# Patient Record
Sex: MALE | Race: ASIAN | Employment: FULL TIME | URBAN - METROPOLITAN AREA
[De-identification: names, ages, dates, MRNs, and addresses within clinical notes are randomized per-mention and may not be internally consistent; named-entity substitution may affect disease eponyms.]

---

## 2017-06-29 ENCOUNTER — LAB ENCOUNTER (OUTPATIENT)
Dept: LAB | Age: 47
End: 2017-06-29
Attending: INTERNAL MEDICINE
Payer: COMMERCIAL

## 2017-06-29 DIAGNOSIS — R53.1 WEAKNESS: Primary | ICD-10-CM

## 2017-06-29 LAB
ALBUMIN SERPL BCP-MCNC: 3.6 G/DL (ref 3.5–4.8)
ALBUMIN/GLOB SERPL: 0.9 {RATIO} (ref 1–2)
ALP SERPL-CCNC: 101 U/L (ref 32–100)
ALT SERPL-CCNC: 62 U/L (ref 17–63)
ANION GAP SERPL CALC-SCNC: 12 MMOL/L (ref 0–18)
AST SERPL-CCNC: 174 U/L (ref 15–41)
BACTERIA UR QL AUTO: NEGATIVE /HPF
BASOPHILS # BLD: 0.2 K/UL (ref 0–0.2)
BASOPHILS NFR BLD: 4 %
BILIRUB SERPL-MCNC: 1.2 MG/DL (ref 0.3–1.2)
BILIRUB UR QL: NEGATIVE
BUN SERPL-MCNC: 4 MG/DL (ref 8–20)
BUN/CREAT SERPL: 7.4 (ref 10–20)
CALCIUM SERPL-MCNC: 9 MG/DL (ref 8.5–10.5)
CHLORIDE SERPL-SCNC: 99 MMOL/L (ref 95–110)
CHOLEST SERPL-MCNC: 242 MG/DL (ref 110–200)
CLARITY UR: CLEAR
CO2 SERPL-SCNC: 28 MMOL/L (ref 22–32)
COLOR UR: YELLOW
CREAT SERPL-MCNC: 0.54 MG/DL (ref 0.5–1.5)
EOSINOPHIL # BLD: 0.7 K/UL (ref 0–0.7)
EOSINOPHIL NFR BLD: 15 %
ERYTHROCYTE [DISTWIDTH] IN BLOOD BY AUTOMATED COUNT: 13.8 % (ref 11–15)
GLOBULIN PLAS-MCNC: 3.8 G/DL (ref 2.5–3.7)
GLUCOSE SERPL-MCNC: 100 MG/DL (ref 70–99)
GLUCOSE UR-MCNC: NEGATIVE MG/DL
HCT VFR BLD AUTO: 39.8 % (ref 41–52)
HDLC SERPL-MCNC: 45 MG/DL
HGB BLD-MCNC: 13.8 G/DL (ref 13.5–17.5)
KETONES UR-MCNC: NEGATIVE MG/DL
LDLC SERPL CALC-MCNC: 160 MG/DL (ref 0–99)
LEUKOCYTE ESTERASE UR QL STRIP.AUTO: NEGATIVE
LYMPHOCYTES # BLD: 1.6 K/UL (ref 1–4)
LYMPHOCYTES NFR BLD: 35 %
MCH RBC QN AUTO: 35 PG (ref 27–32)
MCHC RBC AUTO-ENTMCNC: 34.5 G/DL (ref 32–37)
MCV RBC AUTO: 101.3 FL (ref 80–100)
MONOCYTES # BLD: 0.4 K/UL (ref 0–1)
MONOCYTES NFR BLD: 9 %
NEUTROPHILS # BLD AUTO: 1.7 K/UL (ref 1.8–7.7)
NEUTROPHILS NFR BLD: 37 %
NITRITE UR QL STRIP.AUTO: NEGATIVE
NONHDLC SERPL-MCNC: 197 MG/DL
OSMOLALITY UR CALC.SUM OF ELEC: 285 MOSM/KG (ref 275–295)
PH UR: 6 [PH] (ref 5–8)
PLATELET # BLD AUTO: 69 K/UL (ref 140–400)
PMV BLD AUTO: 10.7 FL (ref 7.4–10.3)
POTASSIUM SERPL-SCNC: 3.2 MMOL/L (ref 3.3–5.1)
PROT SERPL-MCNC: 7.4 G/DL (ref 5.9–8.4)
PROT UR-MCNC: 30 MG/DL
RBC # BLD AUTO: 3.93 M/UL (ref 4.5–5.9)
RBC #/AREA URNS AUTO: 1 /HPF
SODIUM SERPL-SCNC: 139 MMOL/L (ref 136–144)
SP GR UR STRIP: 1.01 (ref 1–1.03)
TRIGL SERPL-MCNC: 185 MG/DL (ref 1–149)
UROBILINOGEN UR STRIP-ACNC: <2
VIT C UR-MCNC: NEGATIVE MG/DL
WBC # BLD AUTO: 4.5 K/UL (ref 4–11)
WBC #/AREA URNS AUTO: 3 /HPF

## 2017-06-29 PROCEDURE — 81001 URINALYSIS AUTO W/SCOPE: CPT

## 2017-06-29 PROCEDURE — 80061 LIPID PANEL: CPT

## 2017-06-29 PROCEDURE — 80053 COMPREHEN METABOLIC PANEL: CPT

## 2017-06-29 PROCEDURE — 36415 COLL VENOUS BLD VENIPUNCTURE: CPT

## 2017-06-29 PROCEDURE — 85025 COMPLETE CBC W/AUTO DIFF WBC: CPT

## 2017-07-21 PROBLEM — F10.939 ALCOHOL WITHDRAWAL (HCC): Status: ACTIVE | Noted: 2017-07-21

## 2017-07-21 PROBLEM — F10.239 ALCOHOL WITHDRAWAL (HCC): Status: ACTIVE | Noted: 2017-07-21

## 2017-07-21 PROBLEM — F10.10 ALCOHOL ABUSE: Status: ACTIVE | Noted: 2017-07-21

## 2017-07-21 NOTE — ED PROVIDER NOTES
Patient Seen in: [facility] AND [facility] Emergency Department    History   Patient presents with:  Eval-P (psychiatric)    Stated Complaint: ETOH treatment    HPI    41year old male who presents for alcohol detox.   The patient states he has been drinking appr distress. HENT:   Head: Normocephalic and atraumatic. Eyes: Conjunctivae and EOM are normal. Pupils are equal, round, and reactive to light. Neck: Normal range of motion. Neck supple.    Cardiovascular: Normal rate, regular rhythm, normal heart sounds Final result                 Please view results for these tests on the individual orders.    RAINBOW DRAW BLUE   RAINBOW DRAW GOLD   RAINBOW DRAW LAVENDER   RAINBOW DRAW LIGHT GREEN   RAINBOW DRAW DARK GREEN   RAINBOW DRAW LAVENDER TALL (BNP)       ==

## 2017-07-21 NOTE — ED NOTES
Care assumed from triage Ambulates with steady gait. Alert and interactive Presents with request for ETOH detox. Last drink 7/20 1/5th of Rum last detox 1 year ago. Denies sz.  Recent outpt labs show elevated LFTs Denies SI/HI/AH/VH

## 2017-07-22 NOTE — PROGRESS NOTES
[facility]    Progress Note    Aaron Garcia Patient Status:  Inpatient    1/10/1970 MRN [id number]   Location [facility] 3W/SW Attending Carly Ayala MD   Hosp Day # 1 PCP Gary Yanes MD, MD     Subjective:     Cons 07/22/2017   AST 96 (H) 07/22/2017   ALT 39 07/22/2017   PTT 28.8 07/22/2017   INR 1.1 07/22/2017   TSH 3.98 07/22/2017   MG 1.2 (L) 07/22/2017   ETOH 121 (H) 07/21/2017                         Sarah Trinidad MD, MD  7/22/2017

## 2017-07-22 NOTE — PLAN OF CARE
CONTINUING CIWA Q2H, SCORES BETWEEN 4 AND 10 TODAY, PT. WITH NAUSEA, GIVEN REGLAN, AND MULTIPLE EPISODES OF DIARRHEA, SAMPLE COLLECTED, PT. ON ISOLATION FOR NOW, IMMODIUM GIVEN BUT NOT HELPING, PT. CONTINUES TO HAVE HIGH BLOOD PRESSURES

## 2017-07-23 NOTE — PLAN OF CARE
CONTINUING CIWA Q4H, SCORES 4 TODAY, PT. FEELING BETTER, STILL HAS TREMORS, DIARRHEA STOPPED AFTER IMODIUM GIVEN, MAGNESIUM AND POTASSIUM REPLACED PER PROTOCOL

## 2017-07-23 NOTE — PLAN OF CARE
DRUG ABUSE/DETOX    • Will have no detox symptoms and will verbalize plan for changing drug-related behavior Not Progressing          CARDIOVASCULAR - ADULT    • Maintains optimal cardiac output and hemodynamic stability Progressing    • Absence of cardiac

## 2017-07-23 NOTE — H&P
[facility]    PATIENT'S NAME: Lucas Felix   ATTENDING PHYSICIAN: Lyubov Freire MD   PATIENT ACCOUNT#:   [id number]    LOCATION:  [address] [place] [address] RECORD #:   [id number]       YOB: 1970  ADMISSION DATE:       07/21 oriented x3, and he gave all pertinent information. Wife is at bedside. HEENT:  Conjunctivae are anicteric. The rest is unremarkable. NECK:  Supple without any lymph node enlargement or carotid bruits.   LUNGS:  Clear bilaterally without any wheezin

## 2017-07-24 NOTE — SIGNIFICANT EVENT
Rapid response note    Rapid response called by nurse for severe confusion. Patient was admitted 3 days ago for alcohol intoxication. There is no significant withdrawal at that time. His detox scores have been low overall.   This morning the wife noted t

## 2017-07-24 NOTE — PROGRESS NOTES
RN called into room by patients wife and patients wife states that patient does not remember who she was. Patient with tremors and asking to go smoke a cigarette. RRT called for further assistance.  Dr [place] hospitalist present orders for ativan IV carried a

## 2017-07-24 NOTE — PLAN OF CARE
CARDIOVASCULAR - ADULT    • Maintains optimal cardiac output and hemodynamic stability Progressing    • Absence of cardiac arrhythmias or at baseline Progressing          DRUG ABUSE/DETOX    • Will have no detox symptoms and will verbalize plan for [place]

## 2017-07-24 NOTE — PROGRESS NOTES
[facility]    [facility] Note    Julien Chicago Patient Status:  Inpatient    1/10/1970 MRN [id number]   Location [facility] 3W/SW Attending Anahi Conn MD   Hosp Day # 2 PCP Rogelio Parr MD, MD     Subjective:     Cons 07/22/2017   PTT 28.8 07/22/2017   INR 1.1 07/22/2017   TSH 3.98 07/22/2017   MG 1.8 07/23/2017   ETOH 121 (H) 07/21/2017                         Val Fernandez MD, MD  7/22/2017

## 2017-07-25 NOTE — PROGRESS NOTES
[facility]    Progress Note    Tasha Ruggiero Patient Status:  Inpatient    1/10/1970 MRN [id number]   Location [facility] 3W/SW Attending Gamal Cabrera MD   Hosp Day # 3 PCP Montel Hashimoto, MD, MD     Subjective:     Cons 07/22/2017   ALB 3.5 07/22/2017   ALKPHO 90 07/22/2017   BILT 2.2 (H) 07/22/2017   TP 7.4 07/22/2017   AST 96 (H) 07/22/2017   ALT 39 07/22/2017   PTT 28.8 07/22/2017   INR 1.1 07/22/2017   TSH 3.98 07/22/2017   MG 1.8 07/24/2017   ETOH 121 (H) 07/21/2017

## 2017-07-25 NOTE — PLAN OF CARE
Problem: Patient/Family Goals  Goal: Patient/Family Long Term Goal  Patient's Long Term Goal: To be discharged home.     Interventions:  - See additional Care Plan goals for specific interventions   Outcome: Progressing    Goal: Patient/Family Short Term Go signs of decreased coronary artery perfusion - ex. Angina  - Evaluate fluid balance, assess for edema, trend weights   Outcome: Progressing  Patient ST on telemetry, but is hemodynamically stable at this time.  Patient denies CP, dizziness, or lightheadedne appropriate pain scale  - Administer analgesics based on type and severity of pain and evaluate response  - Implement non-pharmacological measures as appropriate and evaluate response  - Consider cultural and social influences on pain and pain management

## 2017-07-26 NOTE — PROGRESS NOTES
[facility]    Progress Note    Barbra Marie Patient Status:  Inpatient    1/10/1970 MRN [id number]   Location [facility] 3W/SW Attending Swapna Jung MD   Hosp Day # 4 PCP Era Anne MD, MD     Subjective:     Cons 07/25/2017   GLU 99 07/25/2017   CA 8.8 07/25/2017   ALB 3.5 07/22/2017   ALKPHO 90 07/22/2017   BILT 2.2 (H) 07/22/2017   TP 7.4 07/22/2017   AST 96 (H) 07/22/2017   ALT 39 07/22/2017   PTT 28.8 07/22/2017   INR 1.1 07/22/2017   TSH 3.98 07/22/2017   MG 1

## 2017-07-26 NOTE — PROGRESS NOTES
[facility]    Progress Note    Marilee Lambert Patient Status:  Inpatient    1/10/1970 MRN [id number]   Location [facility] 3W/SW Attending Indiana Hand MD   Hosp Day # 5 PCP Jono Vidales MD, MD     Subjective:     Cons 07/26/2017    (L) 07/26/2017   K 4.0 07/26/2017   K 4.0 07/26/2017   CL 99 07/26/2017   CO2 24 07/26/2017   GLU 99 07/26/2017   CA 9.1 07/26/2017   ALB 3.5 07/22/2017   ALKPHO 90 07/22/2017   BILT 2.2 (H) 07/22/2017   TP 7.4 07/22/2017   AST 96 (H) 0

## 2017-07-26 NOTE — PROGRESS NOTES
Spoke with Dr. Latisha Aguiar about patient's plan of care. ICU floor is reaching capacity. Request patient to be transferred. Physician aware. Okay to transfer patient to general floor. Patient to transfer to Cardiac Telemetry on remote tele. Continue all orders.

## 2017-07-26 NOTE — PROGRESS NOTES
[facility]    Progress Note    Bella Horton Patient Status:  Inpatient    1/10/1970 MRN [id number]   Location [facility] 3W/SW Attending Vi Aragon MD   Hosp Day # 5 PCP Nikolay Mcbride MD, MD     Subjective:     Cons CO2 24 07/26/2017   GLU 99 07/26/2017   CA 9.1 07/26/2017   ALB 3.5 07/22/2017   ALKPHO 90 07/22/2017   BILT 2.2 (H) 07/22/2017   TP 7.4 07/22/2017   AST 96 (H) 07/22/2017   ALT 39 07/22/2017   PTT 28.8 07/22/2017   INR 1.1 07/22/2017   TSH 3.98 07/22/20

## 2017-07-26 NOTE — PROGRESS NOTES
Patient report given to [facility] on Cardiac Telemetry. Questions and concerns answered at this time. Ready to transfer patient.

## 2017-07-26 NOTE — PLAN OF CARE
DRUG ABUSE/DETOX    • Will have no detox symptoms and will verbalize plan for changing drug-related behavior Not Progressing    Hand tremors noted. Occasionally disoriented to date, time and location. Calm. Ativan Drip. See mar.        CARDIOVASCULAR - ADUL

## 2017-07-26 NOTE — PLAN OF CARE
CARDIOVASCULAR - ADULT    • Maintains optimal cardiac output and hemodynamic stability Progressing    • Absence of cardiac arrhythmias or at baseline Progressing    Output/hemodynamic stability achieved     DRUG ABUSE/DETOX    • Will have no detox symptoms

## 2017-07-26 NOTE — PROGRESS NOTES
7/26/17 - per RN stool studies negative. Patient no longer having diarrhea. Will remove Altapure indication.

## 2017-07-26 NOTE — PROGRESS NOTES
[facility]    Progress Note    Susanne Kanner Patient Status:  Inpatient    1/10/1970 MRN [id number]   Location [facility] 3W/SW Attending Araceli Smiley MD   Hosp Day # 5 PCP Sarah Trinidad MD, MD     Subjective:   Eduard Cox

## 2017-07-27 NOTE — OCCUPATIONAL THERAPY NOTE
OCCUPATIONAL THERAPY QUICK EVALUATION - INPATIENT    Room Number: 343/343-A  Evaluation Date: 7/27/2017     Type of Evaluation: Initial  Presenting Problem: etoh    Physician Order: IP Consult to Occupational Therapy  Reason for Therapy:  ADL/IADL Dysfunct grooming such as brushing teeth?: None  -   Eating meals?: None    AM-PAC Score:  Score: 24  Approx Degree of Impairment: 0%  Standardized Score (AM-PAC Scale): 57.54  CMS Modifier (G-Code): CH    FUNCTIONAL TRANSFER ASSESSMENT  Supine to Sit : Supervision

## 2017-07-27 NOTE — PHYSICAL THERAPY NOTE
PHYSICAL THERAPY QUICK EVALUATION - INPATIENT    Room Number: 343/343-A  Evaluation Date: 7/27/2017  Presenting Problem: alcohol ause & withdrawal  Physician Order: PT Eval and Treat    Problem List  Principal Problem:    Alcohol abuse  Active Problems: Therapy Provided: bed mobility, transfers, gait    Patient End of Session: Up in chair;Needs met;Call light within reach;RN aware of session/findings; All patient questions and concerns addressed; Family present    ASSESSMENT   Consulted w/ RN prior to seein

## 2017-07-27 NOTE — PROGRESS NOTES
Pt discharged to home per MD Georges. RN discussed meds and follow-up information with pt and wife. Scripts provided. IV discontinued. RN educated pt on smoking cessation. Pts wife will drive him home.

## 2017-08-30 NOTE — DISCHARGE SUMMARY
[facility]    PATIENT'S NAME: [facility]   ATTENDING PHYSICIAN: Lyubov Landa MD   PATIENT ACCOUNT#:   [id number]    LOCATION:  [address] [place] [address] Dr #:   [id number]       YOB: 1970  ADMISSION DATE:       07/21 alcohol. Patient promised that he will abstain. Patient also instructed to avoid non-steroidal anti-inflammatory medications until seen by me. Dictated By Josefina Herman.  Agustín Ji MD  d: 08/29/2017 20:44:55  t: 08/30/2017 07:03:38  [facility] [id number]  Gayathri Tomas

## 2019-02-16 NOTE — BH LEVEL OF CARE ASSESSMENT
Level of Care Assessment Note    General Questions  Why are you here?: Pt was seen yesterday at his primary care physician and it was discovered that the pt has high blood pressure and increased heart rate.  Pt is also an alcoholic and the PCP suggested the employment   Past Suicidal Ideation: Denies  Family History or Personal Lived Experience of Loss or Near Loss by Suicide: Denies    Danger to Others/Property  Have you harmed someone or had thoughts about wanting someone harmed or killed in the past 30 day psychiatrist or therapist. Pt denies previous hospitalizations. )                          Current/Previous MH/CD Treatment  Recovery Support Groups: Denies Past History  History of Seclusion/Restraint: No    Alcohol Use  How often do you have a drink cont Harmed by a Partner/Caregiver?: No  Health Concerns r/t Abuse: No  Possible Abuse Reportable to[de-identified] Not appropriate for reporting to authorities    General Appearance  Characteristics: Appropriate clothing;Good hygiene  Eye Contact: Indirect  Psychomotor Beh completed and offered for pt and wife to go to [facility] to begin treatment)  Refused Treatment: Yes  Refused Treatment Reason: Needs time to Decide/Discuss(Both pt and wife state that they are unable to go to [facility] this evening and would like time

## 2019-02-16 NOTE — ED NOTES
Patient states he is here for detox from alcohol. Last drink was at 1300.  Patient states he has 10-15 shots of alcohol a day,

## 2019-02-16 NOTE — ED PROVIDER NOTES
Patient Seen in: [facility] AND [facility] Emergency Department    History   Patient presents with:  Substance Abuse    Stated Complaint:     HPI    24-year-old male with history of hypertension, alcoholism, recently binge drinking over the past week, here with and vital signs reviewed. All other systems reviewed and negative except as noted above.     Physical Exam     ED Triage Vitals [02/15/19 1845]   BP (!) 195/108   Pulse 120   Resp 22   Temp 97.2 °F (36.2 °C)   Temp src Temporal   SpO2 99 %   O2 Device rhythm    Pulse Oximeter:  Pulse oximetry on room air is 99%, indicating adequate oxygenation.     PROCEDURES:  none    DIAGNOSTICS:   Labs:  Recent Results (from the past 24 hour(s))   BASIC METABOLIC PANEL (8)    Collection Time: 02/15/19  7:42 PM   Resul Range    Hold Gold Auto Resulted     HOLD     RAINBOW DRAW BLUE    Collection Time: 02/15/19  7:47 PM   Result Value Ref Range    Hold Blue Auto Resulted        Imaging Results Available and Reviewed by me while in ED:          [address] [facility] re-checked within 1 week was provided. Medical Record Review: I personally reviewed available prior medical records for any recent pertinent discharge summaries, testing, and procedures, and reviewed those reports. Complicating Factors:  The patient

## 2024-07-12 ENCOUNTER — LAB ENCOUNTER (OUTPATIENT)
Dept: LAB | Age: 54
End: 2024-07-12
Attending: INTERNAL MEDICINE
Payer: MEDICARE

## 2024-07-12 DIAGNOSIS — E87.5 HYPERKALEMIA: ICD-10-CM

## 2024-07-12 DIAGNOSIS — K74.60 CIRRHOSIS (HCC): Primary | ICD-10-CM

## 2024-07-12 LAB
ALBUMIN SERPL-MCNC: 3.9 G/DL (ref 3.2–4.8)
ALBUMIN/GLOB SERPL: 1.1 {RATIO} (ref 1–2)
ALP LIVER SERPL-CCNC: 97 U/L
ALT SERPL-CCNC: 30 U/L
ANION GAP SERPL CALC-SCNC: 5 MMOL/L (ref 0–18)
AST SERPL-CCNC: 41 U/L (ref ?–34)
BASOPHILS # BLD AUTO: 0.06 X10(3) UL (ref 0–0.2)
BASOPHILS NFR BLD AUTO: 1 %
BILIRUB SERPL-MCNC: 1 MG/DL (ref 0.3–1.2)
BUN BLD-MCNC: 13 MG/DL (ref 9–23)
BUN/CREAT SERPL: 16.3 (ref 10–20)
CALCIUM BLD-MCNC: 9.6 MG/DL (ref 8.7–10.4)
CHLORIDE SERPL-SCNC: 105 MMOL/L (ref 98–112)
CO2 SERPL-SCNC: 27 MMOL/L (ref 21–32)
CREAT BLD-MCNC: 0.8 MG/DL
DEPRECATED RDW RBC AUTO: 49.1 FL (ref 35.1–46.3)
EGFRCR SERPLBLD CKD-EPI 2021: 106 ML/MIN/1.73M2 (ref 60–?)
EOSINOPHIL # BLD AUTO: 0.84 X10(3) UL (ref 0–0.7)
EOSINOPHIL NFR BLD AUTO: 14.5 %
ERYTHROCYTE [DISTWIDTH] IN BLOOD BY AUTOMATED COUNT: 14.1 % (ref 11–15)
FASTING STATUS PATIENT QL REPORTED: YES
GLOBULIN PLAS-MCNC: 3.5 G/DL (ref 2–3.5)
GLUCOSE BLD-MCNC: 91 MG/DL (ref 70–99)
HCT VFR BLD AUTO: 39.1 %
HGB BLD-MCNC: 13.5 G/DL
IMM GRANULOCYTES # BLD AUTO: 0.01 X10(3) UL (ref 0–1)
IMM GRANULOCYTES NFR BLD: 0.2 %
INR BLD: 1.12 (ref 0.8–1.2)
LYMPHOCYTES # BLD AUTO: 1.26 X10(3) UL (ref 1–4)
LYMPHOCYTES NFR BLD AUTO: 21.7 %
MCH RBC QN AUTO: 32.9 PG (ref 26–34)
MCHC RBC AUTO-ENTMCNC: 34.5 G/DL (ref 31–37)
MCV RBC AUTO: 95.4 FL
MONOCYTES # BLD AUTO: 0.61 X10(3) UL (ref 0.1–1)
MONOCYTES NFR BLD AUTO: 10.5 %
NEUTROPHILS # BLD AUTO: 3.03 X10 (3) UL (ref 1.5–7.7)
NEUTROPHILS # BLD AUTO: 3.03 X10(3) UL (ref 1.5–7.7)
NEUTROPHILS NFR BLD AUTO: 52.1 %
OSMOLALITY SERPL CALC.SUM OF ELEC: 284 MOSM/KG (ref 275–295)
PLATELET # BLD AUTO: 108 10(3)UL (ref 150–450)
POTASSIUM SERPL-SCNC: 4.8 MMOL/L (ref 3.5–5.1)
PROT SERPL-MCNC: 7.4 G/DL (ref 5.7–8.2)
PROTHROMBIN TIME: 15.1 SECONDS (ref 11.6–14.8)
RBC # BLD AUTO: 4.1 X10(6)UL
SODIUM SERPL-SCNC: 137 MMOL/L (ref 136–145)
WBC # BLD AUTO: 5.8 X10(3) UL (ref 4–11)

## 2024-07-12 PROCEDURE — 85610 PROTHROMBIN TIME: CPT

## 2024-07-12 PROCEDURE — 80053 COMPREHEN METABOLIC PANEL: CPT

## 2024-07-12 PROCEDURE — 85025 COMPLETE CBC W/AUTO DIFF WBC: CPT

## 2024-07-12 PROCEDURE — 36415 COLL VENOUS BLD VENIPUNCTURE: CPT
